# Patient Record
Sex: MALE | Race: WHITE | Employment: FULL TIME | URBAN - NONMETROPOLITAN AREA
[De-identification: names, ages, dates, MRNs, and addresses within clinical notes are randomized per-mention and may not be internally consistent; named-entity substitution may affect disease eponyms.]

---

## 2022-12-13 ENCOUNTER — HOSPITAL ENCOUNTER (EMERGENCY)
Age: 22
Discharge: HOME | End: 2022-12-13

## 2022-12-13 VITALS
BODY MASS INDEX: 30.31 KG/M2 | OXYGEN SATURATION: 97 % | DIASTOLIC BLOOD PRESSURE: 82 MMHG | HEART RATE: 80 BPM | SYSTOLIC BLOOD PRESSURE: 138 MMHG | RESPIRATION RATE: 16 BRPM | HEIGHT: 68 IN | TEMPERATURE: 99 F | WEIGHT: 200 LBS

## 2022-12-13 DIAGNOSIS — K62.89 ANAL IRRITATION: Primary | ICD-10-CM

## 2022-12-13 PROCEDURE — 99203 OFFICE O/P NEW LOW 30 MIN: CPT

## 2022-12-13 RX ORDER — IBUPROFEN 200 MG
200 TABLET ORAL EVERY 6 HOURS PRN
COMMUNITY

## 2022-12-13 ASSESSMENT — PAIN SCALES - GENERAL: PAINLEVEL_OUTOF10: 6

## 2022-12-13 ASSESSMENT — PAIN DESCRIPTION - PAIN TYPE: TYPE: ACUTE PAIN

## 2022-12-13 ASSESSMENT — ENCOUNTER SYMPTOMS
RECTAL PAIN: 1
VOMITING: 0
NAUSEA: 0
EYE DISCHARGE: 0
RHINORRHEA: 0
ANAL BLEEDING: 1
COUGH: 0
ABDOMINAL PAIN: 0
TROUBLE SWALLOWING: 0
EYE REDNESS: 0
BLOOD IN STOOL: 0
SORE THROAT: 0
ABDOMINAL DISTENTION: 0
SHORTNESS OF BREATH: 0
DIARRHEA: 0

## 2022-12-13 ASSESSMENT — PAIN - FUNCTIONAL ASSESSMENT: PAIN_FUNCTIONAL_ASSESSMENT: 0-10

## 2022-12-13 ASSESSMENT — PAIN DESCRIPTION - DESCRIPTORS: DESCRIPTORS: DULL

## 2022-12-13 ASSESSMENT — PAIN DESCRIPTION - FREQUENCY: FREQUENCY: CONTINUOUS

## 2022-12-13 ASSESSMENT — PAIN DESCRIPTION - LOCATION: LOCATION: RECTUM

## 2022-12-13 NOTE — Clinical Note
Josh Espinosa was seen and treated in our emergency department on 12/13/2022. He may return to work on 12/14/2022. If you have any questions or concerns, please don't hesitate to call.       Mathew Greene, SHAI - CNP

## 2022-12-14 NOTE — ED TRIAGE NOTES
Arrives to [facility] for the evaluation of possible hemorrhoid. First noticed 2 days ago after a BM. Blood noticed after wiping and felt a lump. Has pain and itching. Reports nausea but states it could be related to pain. Temp- 99.0 (o). Taking Advil Dual as needed for pain. Had a dose at 1030 this morning. Waiting provider to assess.

## 2022-12-14 NOTE — ED PROVIDER NOTES
[place] [address]  Urgent Care Encounter      CHIEF COMPLAINT       Chief Complaint   Patient presents with    Rectal Pain     Possible hemorrhoid- Blood noticed with wipe, Lump felt, itching        Nurses Notes reviewed and I agree except as noted in the HPI. HISTORY OF PRESENT ILLNESS   Ebony Mcclendon is a 23 y.o. male who presents for evaluation of possible hemorrhoid. Onset between 2 and 3 days ago. Patient complains of rectal bump, irritation, bleeding with wiping. No history of hemorrhoid. No anal intercourse. No treatment prior to arrival.    REVIEW OF SYSTEMS     Review of Systems   Constitutional:  Negative for chills, diaphoresis, fatigue and fever. HENT:  Negative for congestion, ear pain, rhinorrhea, sore throat and trouble swallowing. Eyes:  Negative for discharge and redness. Respiratory:  Negative for cough and shortness of breath. Cardiovascular:  Negative for chest pain. Gastrointestinal:  Positive for anal bleeding (blood with wiping) and rectal pain (irritation). Negative for abdominal distention, abdominal pain, blood in stool, diarrhea, nausea and vomiting. Genitourinary:  Negative for decreased urine volume. Musculoskeletal:  Negative for neck pain and neck stiffness. Skin:  Negative for rash. Neurological:  Negative for headaches. Hematological:  Negative for adenopathy. Psychiatric/Behavioral:  Negative for sleep disturbance. PAST MEDICAL HISTORY   History reviewed. No pertinent past medical history. SURGICAL HISTORY     Patient  has no past surgical history on file. CURRENT MEDICATIONS       Previous Medications    IBUPROFEN (ADVIL;MOTRIN) 200 MG TABLET    Take 200 mg by mouth every 6 hours as needed for Pain       ALLERGIES     Patient is has No Known Allergies. FAMILY HISTORY     Patient'sfamily history is not on file. SOCIAL HISTORY     Patient  reports that he has never smoked.  He has never used smokeless tobacco. He reports that he does not currently use alcohol. He reports that he does not currently use drugs after having used the following drugs: Marijuana Kenard Snooks). PHYSICAL EXAM     ED TRIAGE VITALS  BP: 138/82, Temp: 99 °F (37.2 °C), Heart Rate: 80, Resp: 16, SpO2: 97 %  Physical Exam  Vitals and nursing note reviewed. Constitutional:       General: He is not in acute distress. Appearance: Normal appearance. He is well-developed. He is not ill-appearing. HENT:      Head: Normocephalic and atraumatic. Right Ear: External ear normal.      Left Ear: External ear normal.      Nose: No congestion. Eyes:      General: No scleral icterus. Conjunctiva/sclera: Conjunctivae normal.   Pulmonary:      Effort: Pulmonary effort is normal. No respiratory distress. Genitourinary:     Rectum: No mass, tenderness, anal fissure or external hemorrhoid. Musculoskeletal:      Cervical back: Normal range of motion. Skin:     General: Skin is warm and dry. Capillary Refill: Capillary refill takes less than 2 seconds. Coloration: Skin is not jaundiced. Findings: No rash. Neurological:      Mental Status: He is alert and oriented to person, place, and time. Psychiatric:         Mood and Affect: Mood normal.         Behavior: Behavior normal. Behavior is cooperative. DIAGNOSTIC RESULTS   Labs:No results found for this visit on 12/13/22. IMAGING:  No orders to display      URGENT CARE COURSE:     Vitals:    12/13/22 1907   BP: 138/82   Pulse: 80   Resp: 16   Temp: 99 °F (37.2 °C)   TempSrc: Oral   SpO2: 97%   Weight: 200 lb (90.7 kg)   Height: 5' 8\" (1.727 m)       Medications - No data to display  PROCEDURES:  None  FINAL IMPRESSION      1. Anal irritation        DISPOSITION/PLAN   DISPOSITION Decision To Discharge 12/13/2022 07:16:34 PM    Nontoxic, no distress. No visible hemorrhoid. No anal lesions. Patient likely has a hemorrhoid that has been reduced. Recommended Preparation H over-the-counter. Increase water, fiber. If any distress go to ER. PATIENT REFERRED TO:  SHAI Lomax CNP  [address]  [phone]      Follow-up as needed. Preparation H over-the-counter. Increase water, fiber. If any distress go to ER.     DISCHARGE MEDICATIONS:  New Prescriptions    No medications on file     Current Discharge Medication List          Valeria Chapa, [address] SHAI [address] - [place]  12/13/22 1848

## 2022-12-14 NOTE — ED NOTES
Discharge instructions reviewed with pt. Pt verbalized understanding. Pt ambulated out in stable condition. Assessment unchanged upon discharge.      Chata Maxwell RN  12/13/22 [id number]

## 2023-01-02 ENCOUNTER — HOSPITAL ENCOUNTER (EMERGENCY)
Age: 23
Discharge: HOME | End: 2023-01-02

## 2023-01-02 VITALS
OXYGEN SATURATION: 96 % | SYSTOLIC BLOOD PRESSURE: 138 MMHG | DIASTOLIC BLOOD PRESSURE: 68 MMHG | RESPIRATION RATE: 16 BRPM | TEMPERATURE: 98 F | HEART RATE: 92 BPM

## 2023-01-02 DIAGNOSIS — Z76.89 RETURN TO WORK EVALUATION: Primary | ICD-10-CM

## 2023-01-02 PROCEDURE — 99213 OFFICE O/P EST LOW 20 MIN: CPT

## 2023-01-02 ASSESSMENT — PAIN - FUNCTIONAL ASSESSMENT: PAIN_FUNCTIONAL_ASSESSMENT: NONE - DENIES PAIN

## 2023-01-02 ASSESSMENT — ENCOUNTER SYMPTOMS
SHORTNESS OF BREATH: 0
WHEEZING: 0
STRIDOR: 0
ABDOMINAL PAIN: 0
COUGH: 0
RHINORRHEA: 0

## 2023-01-02 NOTE — ED PROVIDER NOTES
[facility]       CHIEF COMPLAINT       Chief Complaint   Patient presents with    Letter for School/Work       Nurses Notes reviewed and I agree except as noted in the HPI. HISTORY OF PRESENT ILLNESS   Veronica Boswell is a 23 y.o. male who presents for complaints of return to work evaluation. Patient states he had COVID-19 10 days ago. He is currently without cough, chest pain, shortness of breath or fever. He denies any current symptoms. 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 The patient does not have proof of his COVID status but states that onset was 10 days ago. HPI    REVIEW OF SYSTEMS     Review of Systems   Constitutional:  Negative for activity change, fatigue and fever. HENT:  Negative for congestion and rhinorrhea. Respiratory:  Negative for cough, shortness of breath, wheezing and stridor. Cardiovascular:  Negative for chest pain. Gastrointestinal:  Negative for abdominal pain. Neurological:  Negative for dizziness and headaches. PAST MEDICAL HISTORY   History reviewed. No pertinent past medical history. SURGICALHISTORY     Patient  has no past surgical history on file. CURRENT MEDICATIONS       Previous Medications    IBUPROFEN (ADVIL;MOTRIN) 200 MG TABLET    Take 200 mg by mouth every 6 hours as needed for Pain       ALLERGIES     Patient is has No Known Allergies. Patients   There is no immunization history on file for this patient. FAMILY HISTORY     Patient's family history is not on file. SOCIAL HISTORY     Patient  reports that he has never smoked. He has never used smokeless tobacco. He reports that he does not currently use alcohol. He reports that he does not currently use drugs after having used the following drugs: Marijuana Greybull Cue).     PHYSICAL EXAM     ED TRIAGE VITALS  BP: 138/68, Temp: 98 °F (36.7 °C), Heart Rate: 92, Resp: 16, SpO2: 96 %,Estimated body mass index is 30.41 kg/m² as calculated from the following:    Height as of 12/13/22: 5' 8\" (1.727 m). Weight as of 12/13/22: 200 lb (90.7 kg). ,No LMP for male patient. Physical Exam  Constitutional:       General: He is not in acute distress. Appearance: He is normal weight. He is not ill-appearing. HENT:      Head: Normocephalic. Mouth/Throat:      Mouth: Mucous membranes are moist.   Cardiovascular:      Rate and Rhythm: Normal rate and regular rhythm. Pulses: Normal pulses. Heart sounds: Normal heart sounds. No murmur heard. Pulmonary:      Effort: Pulmonary effort is normal. No respiratory distress. Breath sounds: Normal breath sounds. No wheezing or rhonchi. Skin:     General: Skin is warm and dry. Capillary Refill: Capillary refill takes less than 2 seconds. Neurological:      General: No focal deficit present. Mental Status: He is alert. Psychiatric:         Mood and Affect: Mood normal.       DIAGNOSTIC RESULTS     Labs:No results found for this visit on 01/02/23. IMAGING:    No orders to display         EKG:      URGENT CARE COURSE:     Vitals:    01/02/23 1851   BP: 138/68   Pulse: 92   Resp: 16   Temp: 98 °F (36.7 °C)   TempSrc: Infrared   SpO2: 96%       Medications - No data to display         PROCEDURES:  None    FINAL IMPRESSION      1. Return to work evaluation          DISPOSITION/ PLAN   Presents for return to work note for reported COVID-19, onset 10 days ago. Patient is without fever. No respiratory distress. No current symptoms. He will be discharged with a return to work note for tomorrow.   Advised to return for new concerns or symptoms        PATIENT REFERRED TO:  SHAI Lopez - CNP  [facility] Rueda [address] [place] [address] [place]:  New Prescriptions    No medications on file       Discontinued Medications    No medications on file       Current Discharge Medication List          SHAI Wall - CNP    (Please note that portions of this note were completed with a voice recognition program. Efforts were made to edit the dictations but occasionally words are mis-transcribed.)           SHAI Nguyen - CNP  01/02/23 0331

## 2023-01-02 NOTE — ED TRIAGE NOTES
Had done a test for covid 10 days ago and tested positive at that time, is now feeling better, needs a return to work note

## 2023-01-02 NOTE — Clinical Note
Faraz Hutchinson was seen and treated in our emergency department on 1/2/2023.  He may return to work on 01/03/2023.  May return to work without restriction     If you have any questions or concerns, please don't hesitate to call.      Fran Peterson, APRN - CNP

## 2023-04-25 ENCOUNTER — HOSPITAL ENCOUNTER (EMERGENCY)
Age: 23
Discharge: HOME | End: 2023-04-25

## 2023-04-25 VITALS
WEIGHT: 210 LBS | BODY MASS INDEX: 31.1 KG/M2 | SYSTOLIC BLOOD PRESSURE: 136 MMHG | HEART RATE: 87 BPM | HEIGHT: 69 IN | RESPIRATION RATE: 16 BRPM | OXYGEN SATURATION: 96 % | DIASTOLIC BLOOD PRESSURE: 80 MMHG | TEMPERATURE: 97.7 F

## 2023-04-25 DIAGNOSIS — J06.9 ACUTE UPPER RESPIRATORY INFECTION: Primary | ICD-10-CM

## 2023-04-25 LAB — S PYO AG THROAT QL: NEGATIVE

## 2023-04-25 PROCEDURE — 99213 OFFICE O/P EST LOW 20 MIN: CPT | Performed by: NURSE PRACTITIONER

## 2023-04-25 PROCEDURE — 99213 OFFICE O/P EST LOW 20 MIN: CPT

## 2023-04-25 PROCEDURE — 87651 STREP A DNA AMP PROBE: CPT

## 2023-04-25 RX ORDER — GUAIFENESIN AND PSEUDOEPHEDRINE HCL 1200; 120 MG/1; MG/1
1 TABLET, EXTENDED RELEASE ORAL 2 TIMES DAILY PRN
Qty: 30 TABLET | Refills: 0 | Status: SHIPPED | OUTPATIENT
Start: 2023-04-25

## 2023-04-25 ASSESSMENT — ENCOUNTER SYMPTOMS
SORE THROAT: 1
WHEEZING: 0
RHINORRHEA: 0
CONSTIPATION: 0
COUGH: 0
SHORTNESS OF BREATH: 0
BLOOD IN STOOL: 0
NAUSEA: 0
DIARRHEA: 0
EYE PAIN: 0
VOMITING: 0
ABDOMINAL PAIN: 0
SINUS PRESSURE: 0

## 2023-04-25 ASSESSMENT — PAIN DESCRIPTION - DESCRIPTORS: DESCRIPTORS: ACHING

## 2023-04-25 ASSESSMENT — PAIN SCALES - GENERAL: PAINLEVEL_OUTOF10: 6

## 2023-04-25 ASSESSMENT — PAIN - FUNCTIONAL ASSESSMENT: PAIN_FUNCTIONAL_ASSESSMENT: 0-10

## 2023-04-25 NOTE — ED TRIAGE NOTES
C/O sore throat and congestion x 2 days increasingly worse. C/O body aches today. Throat culture obtained and sent to lab.
Middle aged, nontoxic, chronically ill appearing M, appears older than stated age.